# Patient Record
Sex: MALE | Race: WHITE | NOT HISPANIC OR LATINO | Employment: FULL TIME | URBAN - METROPOLITAN AREA
[De-identification: names, ages, dates, MRNs, and addresses within clinical notes are randomized per-mention and may not be internally consistent; named-entity substitution may affect disease eponyms.]

---

## 2019-04-29 ENCOUNTER — OFFICE VISIT (OUTPATIENT)
Dept: UROLOGY | Facility: MEDICAL CENTER | Age: 64
End: 2019-04-29
Payer: COMMERCIAL

## 2019-04-29 VITALS
HEIGHT: 71 IN | SYSTOLIC BLOOD PRESSURE: 136 MMHG | WEIGHT: 315 LBS | DIASTOLIC BLOOD PRESSURE: 78 MMHG | BODY MASS INDEX: 44.1 KG/M2

## 2019-04-29 DIAGNOSIS — N40.1 BPH WITH URINARY OBSTRUCTION: Primary | ICD-10-CM

## 2019-04-29 DIAGNOSIS — N52.8 MIXED ERECTILE DYSFUNCTION: ICD-10-CM

## 2019-04-29 DIAGNOSIS — N13.8 BPH WITH URINARY OBSTRUCTION: Primary | ICD-10-CM

## 2019-04-29 LAB
SL AMB  POCT GLUCOSE, UA: ABNORMAL
SL AMB LEUKOCYTE ESTERASE,UA: ABNORMAL
SL AMB POCT BILIRUBIN,UA: ABNORMAL
SL AMB POCT BLOOD,UA: ABNORMAL
SL AMB POCT CLARITY,UA: CLEAR
SL AMB POCT COLOR,UA: YELLOW
SL AMB POCT KETONES,UA: ABNORMAL
SL AMB POCT NITRITE,UA: ABNORMAL
SL AMB POCT PH,UA: 5
SL AMB POCT SPECIFIC GRAVITY,UA: 1.02
SL AMB POCT URINE PROTEIN: ABNORMAL
SL AMB POCT UROBILINOGEN: 0.2

## 2019-04-29 PROCEDURE — 99244 OFF/OP CNSLTJ NEW/EST MOD 40: CPT | Performed by: UROLOGY

## 2019-04-29 PROCEDURE — 81003 URINALYSIS AUTO W/O SCOPE: CPT | Performed by: UROLOGY

## 2019-04-29 RX ORDER — SILDENAFIL CITRATE 20 MG/1
TABLET ORAL
Qty: 90 TABLET | Refills: 3 | Status: SHIPPED | OUTPATIENT
Start: 2019-04-29 | End: 2020-05-01 | Stop reason: DRUGHIGH

## 2020-05-01 DIAGNOSIS — N52.8 MIXED ERECTILE DYSFUNCTION: Primary | ICD-10-CM

## 2020-05-04 RX ORDER — SILDENAFIL 100 MG/1
TABLET, FILM COATED ORAL
Qty: 30 TABLET | Refills: 1 | OUTPATIENT
Start: 2020-05-04 | End: 2021-03-08 | Stop reason: DRUGHIGH

## 2020-05-06 RX ORDER — SILDENAFIL CITRATE 20 MG/1
TABLET ORAL
Qty: 90 TABLET | Refills: 3 | Status: SHIPPED | OUTPATIENT
Start: 2020-05-06 | End: 2021-03-08 | Stop reason: ALTCHOICE

## 2020-07-20 ENCOUNTER — TELEPHONE (OUTPATIENT)
Dept: UROLOGY | Facility: MEDICAL CENTER | Age: 65
End: 2020-07-20

## 2020-07-20 NOTE — TELEPHONE ENCOUNTER
Pt called regarding message he states Medicare is primary he did not have his card to give information,also his supplement is Zoran URIBE#[id number]  # he will call back with new Medicare #

## 2020-07-27 ENCOUNTER — OFFICE VISIT (OUTPATIENT)
Dept: UROLOGY | Facility: MEDICAL CENTER | Age: 65
End: 2020-07-27
Payer: MEDICARE

## 2020-07-27 VITALS
DIASTOLIC BLOOD PRESSURE: 78 MMHG | SYSTOLIC BLOOD PRESSURE: 134 MMHG | BODY MASS INDEX: 44.1 KG/M2 | WEIGHT: 315 LBS | TEMPERATURE: 96.6 F | HEIGHT: 71 IN

## 2020-07-27 DIAGNOSIS — N13.8 BPH WITH URINARY OBSTRUCTION: Primary | ICD-10-CM

## 2020-07-27 DIAGNOSIS — E29.1 HYPOGONADISM IN MALE: ICD-10-CM

## 2020-07-27 DIAGNOSIS — N40.1 BPH WITH URINARY OBSTRUCTION: Primary | ICD-10-CM

## 2020-07-27 DIAGNOSIS — E66.01 MORBID OBESITY WITH BMI OF 45.0-49.9, ADULT (HCC): ICD-10-CM

## 2020-07-27 DIAGNOSIS — N52.8 MIXED ERECTILE DYSFUNCTION: ICD-10-CM

## 2020-07-27 PROCEDURE — 99214 OFFICE O/P EST MOD 30 MIN: CPT | Performed by: UROLOGY

## 2020-07-27 RX ORDER — MAG HYDROX/ALUMINUM HYD/SIMETH 400-400-40
SUSPENSION, ORAL (FINAL DOSE FORM) ORAL
COMMUNITY

## 2020-07-27 NOTE — PROGRESS NOTES
HISTORY:    1  Recently started testosterone injections every two weeks at PCP  His T level was mildly low, 179  He thinks perhaps his workouts at the gym are more productive, and he is gaining muscle mass, and that is why cannot lose weight  2  ED, symptoms come and go, not using Viagra now  3  Morbid obesity, we talked about that in some detail  4  Mild BPH, not that bothered by change in flow    Recent PSA 0 6         ASSESSMENT / PLAN:    I strongly encouraged patient to go that go back to [facility] for nutrition advice    I told him when he gets his T level checked, it should be about two days after an injection  If other symptoms stable, follow-up two years    The following portions of the patient's history were reviewed and updated as appropriate: allergies, current medications, past family history, past medical history, past social history, past surgical history and problem list     Review of Systems   All other systems reviewed and are negative  Objective:     Physical Exam   Constitutional: He is oriented to person, place, and time  He appears well-developed and well-nourished  No distress  Morbidly obese   HENT:   Head: Normocephalic and atraumatic  Eyes: No scleral icterus  Pulmonary/Chest: Effort normal    Genitourinary:   Genitourinary Comments: Penis testes normal    Cannot feel prostate due to obesity   Neurological: He is alert and oriented to person, place, and time  Skin: He is not diaphoretic  No pallor  Psychiatric: He has a normal mood and affect   His behavior is normal  Judgment and thought content normal          No results found for: PSA]  No results found for: BUN  No results found for: CREATININE  No components found for: CBC      Patient Active Problem List   Diagnosis    BPH with urinary obstruction    Mixed erectile dysfunction    Morbid obesity with BMI of 45 0-49 9, adult ([facility] [address] )        Diagnoses and all orders for this visit:    BPH with urinary obstruction    Mixed erectile dysfunction    Hypogonadism in male    Morbid obesity with BMI of 45 0-49 9, adult ([facility] [address] )    Other orders  -     Cholecalciferol (VITAMIN D3) 125 MCG (5000 UT) CAPS; Take by mouth  -     TESTOSTERONE CYPIONATE IJ; Inject as directed           Patient ID: Isha Henderson is a 72 y o  male  Current Outpatient Medications:     aspirin 325 mg tablet, Take 325 mg by mouth, Disp: , Rfl:     Cholecalciferol (VITAMIN D3) 125 MCG (5000 UT) CAPS, Take by mouth, Disp: , Rfl:     Lancets MISC, Test blood sugar 2-3x daily  Verio lancets, Disp: , Rfl:     TESTOSTERONE CYPIONATE IJ, Inject as directed, Disp: , Rfl:     insulin glargine (LANTUS SOLOSTAR) 100 units/mL injection pen, Inject 20 Units under the skin, Disp: , Rfl:     montelukast (SINGULAIR) 10 mg tablet, Take 10 mg by mouth, Disp: , Rfl:     rosuvastatin (CRESTOR) 10 MG tablet, 3 (three) times a week (MWF) at 1800 , Disp: , Rfl:     sildenafil (REVATIO) 20 mg tablet, TAKE 3,4 OR 5 TABLETS BY MOUTH AS NEEDED  (Patient not taking: Reported on 7/27/2020), Disp: 90 tablet, Rfl: 3    sildenafil (VIAGRA) 100 mg tablet, Take 1 tablet by mouth one (1) hour prior to intercourse on an empty stomach  Limit to 3 encounters per week   (Patient not taking: Reported on 7/27/2020), Disp: 30 tablet, Rfl: 1    Past Medical History:   Diagnosis Date    Asthma     BPH with obstruction/lower urinary tract symptoms     Heart disease     Type 2 diabetes mellitus (HCC)     Urgency of urination        Past Surgical History:   Procedure Laterality Date    APPENDECTOMY         Social History

## 2020-09-08 DIAGNOSIS — Z01.812 PRE-PROCEDURAL LABORATORY EXAMINATIONS: ICD-10-CM

## 2020-09-08 PROCEDURE — U0003 INFECTIOUS AGENT DETECTION BY NUCLEIC ACID (DNA OR RNA); SEVERE ACUTE RESPIRATORY SYNDROME CORONAVIRUS 2 (SARS-COV-2) (CORONAVIRUS DISEASE [COVID-19]), AMPLIFIED PROBE TECHNIQUE, MAKING USE OF HIGH THROUGHPUT TECHNOLOGIES AS DESCRIBED BY CMS-2020-01-R: HCPCS

## 2020-09-09 LAB — SARS-COV-2 RNA SPEC QL NAA+PROBE: NOT DETECTED

## 2021-03-05 DIAGNOSIS — N52.8 MIXED ERECTILE DYSFUNCTION: Primary | ICD-10-CM

## 2021-03-05 NOTE — TELEPHONE ENCOUNTER
Pt managed by Dr Aguirre, pt called asking if physician could prescribe generic Cialis [facility] as Sildenafil not working

## 2021-03-08 RX ORDER — TADALAFIL 20 MG/1
TABLET ORAL
Qty: 10 TABLET | Refills: 1 | Status: SHIPPED | OUTPATIENT
Start: 2021-03-08 | End: 2022-05-04 | Stop reason: SDUPTHER

## 2021-03-08 NOTE — TELEPHONE ENCOUNTER
Patient last seen July, 2020 for year exam and not expected back for TWO YEARS  Patient states Sildenafil 100mg is ineffective and would like to try Tadalafil        Script for the requested medication was queued and forwarded to the Advanced Practitioner covering the \[facility]\"" location for approval

## 2021-03-09 ENCOUNTER — TRANSCRIBE ORDERS (OUTPATIENT)
Dept: ADMISSIONS | Facility: HOSPITAL | Age: 66
End: 2021-03-09

## 2021-03-12 ENCOUNTER — IMMUNIZATIONS (OUTPATIENT)
Dept: FAMILY MEDICINE CLINIC | Facility: HOSPITAL | Age: 66
End: 2021-03-12

## 2021-03-12 DIAGNOSIS — Z23 ENCOUNTER FOR IMMUNIZATION: Primary | ICD-10-CM

## 2021-03-12 PROCEDURE — 0011A SARS-COV-2 / COVID-19 MRNA VACCINE (MODERNA) 100 MCG: CPT

## 2021-03-12 PROCEDURE — 91301 SARS-COV-2 / COVID-19 MRNA VACCINE (MODERNA) 100 MCG: CPT

## 2021-04-09 ENCOUNTER — IMMUNIZATIONS (OUTPATIENT)
Dept: FAMILY MEDICINE CLINIC | Facility: HOSPITAL | Age: 66
End: 2021-04-09

## 2021-04-09 DIAGNOSIS — Z23 ENCOUNTER FOR IMMUNIZATION: Primary | ICD-10-CM

## 2021-04-09 PROCEDURE — 0012A SARS-COV-2 / COVID-19 MRNA VACCINE (MODERNA) 100 MCG: CPT

## 2021-04-09 PROCEDURE — 91301 SARS-COV-2 / COVID-19 MRNA VACCINE (MODERNA) 100 MCG: CPT

## 2021-07-09 ENCOUNTER — TELEPHONE (OUTPATIENT)
Dept: UROLOGY | Facility: MEDICAL CENTER | Age: 66
End: 2021-07-09

## 2021-07-09 NOTE — TELEPHONE ENCOUNTER
Patient due this month for an office visit with Dr Sebastien Snell in the [facility] location  He is overdue for an office visit  The requested prescription is DECLINED at this time until an appointment can be scheduled and kept  Message will be forwarded to the [facility] so they can schedule an appointment

## 2021-07-31 ENCOUNTER — TELEPHONE (OUTPATIENT)
Dept: OTHER | Facility: OTHER | Age: 66
End: 2021-07-31

## 2021-07-31 NOTE — TELEPHONE ENCOUNTER
Patient called to cancel appointment schedule for Monday Aug 2, 2021  4:00 PM  FOLLOW UP [facility]  Nora Hylton PA-C  [facility]  Due to out of town in [place] and will call back when ready to reschedule currently has a busy schedule

## 2021-10-08 ENCOUNTER — NURSE TRIAGE (OUTPATIENT)
Dept: OTHER | Facility: OTHER | Age: 66
End: 2021-10-08

## 2021-10-08 DIAGNOSIS — Z20.822 EXPOSURE TO COVID-19 VIRUS: Primary | ICD-10-CM

## 2021-12-03 ENCOUNTER — IMMUNIZATIONS (OUTPATIENT)
Dept: FAMILY MEDICINE CLINIC | Facility: HOSPITAL | Age: 66
End: 2021-12-03

## 2021-12-03 DIAGNOSIS — Z23 ENCOUNTER FOR IMMUNIZATION: Primary | ICD-10-CM

## 2021-12-03 PROCEDURE — 0064A COVID-19 MODERNA VACC 0.25 ML BOOSTER: CPT

## 2021-12-03 PROCEDURE — 91306 COVID-19 MODERNA VACC 0.25 ML BOOSTER: CPT

## 2021-12-30 PROCEDURE — U0003 INFECTIOUS AGENT DETECTION BY NUCLEIC ACID (DNA OR RNA); SEVERE ACUTE RESPIRATORY SYNDROME CORONAVIRUS 2 (SARS-COV-2) (CORONAVIRUS DISEASE [COVID-19]), AMPLIFIED PROBE TECHNIQUE, MAKING USE OF HIGH THROUGHPUT TECHNOLOGIES AS DESCRIBED BY CMS-2020-01-R: HCPCS | Performed by: FAMILY MEDICINE

## 2022-05-04 ENCOUNTER — OFFICE VISIT (OUTPATIENT)
Dept: UROLOGY | Facility: MEDICAL CENTER | Age: 67
End: 2022-05-04
Payer: MEDICARE

## 2022-05-04 VITALS
SYSTOLIC BLOOD PRESSURE: 160 MMHG | BODY MASS INDEX: 43.68 KG/M2 | HEIGHT: 71 IN | DIASTOLIC BLOOD PRESSURE: 90 MMHG | WEIGHT: 312 LBS | HEART RATE: 86 BPM

## 2022-05-04 DIAGNOSIS — N52.1 ERECTILE DYSFUNCTION ASSOCIATED WITH TYPE 2 DIABETES MELLITUS (HCC): Primary | ICD-10-CM

## 2022-05-04 DIAGNOSIS — R68.82 LOW LIBIDO: ICD-10-CM

## 2022-05-04 DIAGNOSIS — N13.8 BPH WITH URINARY OBSTRUCTION: ICD-10-CM

## 2022-05-04 DIAGNOSIS — Z12.5 PROSTATE CANCER SCREENING: ICD-10-CM

## 2022-05-04 DIAGNOSIS — N52.8 MIXED ERECTILE DYSFUNCTION: ICD-10-CM

## 2022-05-04 DIAGNOSIS — E29.1 HYPOGONADISM IN MALE: ICD-10-CM

## 2022-05-04 DIAGNOSIS — N40.1 BPH WITH URINARY OBSTRUCTION: ICD-10-CM

## 2022-05-04 DIAGNOSIS — E11.69 ERECTILE DYSFUNCTION ASSOCIATED WITH TYPE 2 DIABETES MELLITUS (HCC): Primary | ICD-10-CM

## 2022-05-04 PROCEDURE — 99214 OFFICE O/P EST MOD 30 MIN: CPT | Performed by: UROLOGY

## 2022-05-04 RX ORDER — TADALAFIL 20 MG/1
TABLET ORAL
Qty: 10 TABLET | Refills: 1 | Status: SHIPPED | OUTPATIENT
Start: 2022-05-04 | End: 2022-08-10 | Stop reason: SDUPTHER

## 2022-05-04 RX ORDER — PREGABALIN 75 MG/1
75 CAPSULE ORAL 3 TIMES DAILY
COMMUNITY
Start: 2022-04-14

## 2022-05-04 RX ORDER — BISOPROLOL FUMARATE 5 MG/1
5 TABLET ORAL DAILY
COMMUNITY
Start: 2022-02-14

## 2022-05-04 RX ORDER — UBIDECARENONE 75 MG
CAPSULE ORAL DAILY
COMMUNITY

## 2022-05-04 NOTE — PROGRESS NOTES
Assessment/Plan:  1  Erectile dysfunction-patient to try Cialis 20 mg on demand  2  Low libido/hypogonadism-on testosterone replacement therapy with last total testosterone within normal range  3  BPH with urinary obstruction-voiding adequately with AUA symptom score only 4 -no intervention  5  Prostate cancer screening-PSA due in August of 2022  ESTELA not suspicious  No problem-specific Assessment & Plan notes found for this encounter  Diagnoses and all orders for this visit:    Erectile dysfunction associated with type 2 diabetes mellitus ([address] )    Low libido    Hypogonadism in male    BPH with urinary obstruction    Prostate cancer screening    Mixed erectile dysfunction  -     tadalafil (CIALIS) 20 MG tablet; Take 1 tablet by mouth one (1) hour prior to intercourse on an empty stomach  Limit to 2 tablets per week  Body mass index (BMI) 40 0-44 9, adult (HCC)    Other orders  -     pregabalin (LYRICA) 75 mg capsule; Take 75 mg by mouth 3 (three) times a day  -     cyanocobalamin (VITAMIN B-12) 100 mcg tablet; Take by mouth daily  -     bisoprolol (ZEBETA) 5 mg tablet; Take 5 mg by mouth daily (Patient not taking: Reported on 5/4/2022 )          Subjective:      Patient ID: Rahat Marcelino is a 79 y o  male  HPI  26-year-old gentleman with history of diabetes mellitus as well as male hypogonadism treated with testosterone replacement therapy presents for discussion of erectile dysfunction  The patient notes that he has used PDE5 inhibitors in the past with some success however he does not usually use them  He notes at ability to get an erection capable of intercourse but notes that he has early loss of erection    He presents for follow-up  The following portions of the patient's history were reviewed and updated as appropriate: allergies, current medications, past family history, past medical history, past social history, past surgical history and problem list     Review of Systems Genitourinary:        AUA symptom score is 4 with nocturia 0-1 time per night 1/5 for frequency intermittency and weakening of the urinary stream    Musculoskeletal: Positive for myalgias  Objective:      /90   Pulse 86   Ht 5' 11" (1 803 m)   Wt (!) 142 kg (312 lb)   BMI 43 52 kg/m²          Physical Exam  Vitals reviewed  Constitutional:       General: He is not in acute distress  Appearance: Normal appearance  He is obese  He is not ill-appearing, toxic-appearing or diaphoretic  HENT:      Head: Normocephalic and atraumatic  Nose: Nose normal       Mouth/Throat:      Mouth: Mucous membranes are moist    Eyes:      Extraocular Movements: Extraocular movements intact  Pulmonary:      Effort: Pulmonary effort is normal  No respiratory distress  Abdominal:      General: There is no distension  Palpations: Abdomen is soft  Genitourinary:     Comments: Phallus normal uncircumcised without cutaneous lesions  Meatus patent normally placed  Scrotum normal without cutaneous lesions  Testes adnexa palpably descended without masses or tenderness  No palpable fluid collections  ESTELA normal anal verge normal anal sphincter tone no palpable rectal masses  Prostate approximately 25-30 g a nodular nontender  Musculoskeletal:         General: Normal range of motion  Skin:     General: Skin is dry  Neurological:      Mental Status: He is alert and oriented to person, place, and time  Psychiatric:         Mood and Affect: Mood normal          Behavior: Behavior normal          Thought Content:  Thought content normal          Judgment: Judgment normal

## 2022-08-10 ENCOUNTER — OFFICE VISIT (OUTPATIENT)
Dept: UROLOGY | Facility: MEDICAL CENTER | Age: 67
End: 2022-08-10
Payer: MEDICARE

## 2022-08-10 VITALS
BODY MASS INDEX: 42.56 KG/M2 | WEIGHT: 304 LBS | DIASTOLIC BLOOD PRESSURE: 80 MMHG | SYSTOLIC BLOOD PRESSURE: 138 MMHG | HEART RATE: 99 BPM | HEIGHT: 71 IN

## 2022-08-10 DIAGNOSIS — N40.1 BPH WITH URINARY OBSTRUCTION: ICD-10-CM

## 2022-08-10 DIAGNOSIS — E11.69 ERECTILE DYSFUNCTION ASSOCIATED WITH TYPE 2 DIABETES MELLITUS (HCC): Primary | ICD-10-CM

## 2022-08-10 DIAGNOSIS — N13.8 BPH WITH URINARY OBSTRUCTION: ICD-10-CM

## 2022-08-10 DIAGNOSIS — N52.1 ERECTILE DYSFUNCTION ASSOCIATED WITH TYPE 2 DIABETES MELLITUS (HCC): Primary | ICD-10-CM

## 2022-08-10 DIAGNOSIS — R68.82 LOW LIBIDO: ICD-10-CM

## 2022-08-10 DIAGNOSIS — Z12.5 PROSTATE CANCER SCREENING: ICD-10-CM

## 2022-08-10 PROCEDURE — 99214 OFFICE O/P EST MOD 30 MIN: CPT | Performed by: UROLOGY

## 2022-08-10 RX ORDER — TESTOSTERONE CYPIONATE 200 MG/ML
INJECTION INTRAMUSCULAR
COMMUNITY
Start: 2022-07-22

## 2022-08-10 RX ORDER — TADALAFIL 20 MG/1
TABLET ORAL
Qty: 30 TABLET | Refills: 5 | Status: SHIPPED | OUTPATIENT
Start: 2022-08-10

## 2022-08-10 NOTE — PROGRESS NOTES
Assessment/Plan:  1  Erectile dysfunction-positive response from Cialis 20 mg with prescription requested  No side effects reported    2  BPH with urinary obstruction-very mild with AUA symptom score only 1     3  Prostate cancer screening-PSA is well within normal limits and ESTELA is not suspicious of malignancy    4  Low libido-minimal patient on testosterone therapy  Discussed oral pills for testosterone replacement therapy and patient will consider        No problem-specific Assessment & Plan notes found for this encounter  Diagnoses and all orders for this visit:    Erectile dysfunction associated with type 2 diabetes mellitus ([address] )    BPH with urinary obstruction    Prostate cancer screening    Low libido    Other orders  -     testosterone cypionate (DEPO-TESTOSTERONE) 200 mg/mL SOLN; INJECT 1 MILLILITER BY INTRAMUSCULAR ROUTE EVERY 2 WEEKS          Subjective:      Patient ID: Cayla Lopez is a 79 y o  male  HPI  66-year-old male on testosterone replacement therapy by his PCP with erectile dysfunction reports Cialis 20 mg gave him a positive erectile response  PSA is well within normal limits and low  The patient denies any significant obstructive irritative voiding symptoms  The following portions of the patient's history were reviewed and updated as appropriate: allergies, current medications, past family history, past medical history, past social history, past surgical history and problem list     Review of Systems   All other systems reviewed and are negative  Objective:      /80   Pulse 99   Ht 5' 11" (1 803 m)   Wt (!) 138 kg (304 lb)   BMI 42 40 kg/m²          Physical Exam  Vitals reviewed  Constitutional:       General: He is not in acute distress  Appearance: Normal appearance  He is obese  He is not ill-appearing, toxic-appearing or diaphoretic  HENT:      Head: Normocephalic and atraumatic        Nose: Nose normal       Mouth/Throat:      Mouth: Mucous membranes are moist    Eyes:      Extraocular Movements: Extraocular movements intact  Pulmonary:      Effort: Pulmonary effort is normal    Genitourinary:     Testes: Normal       Comments: Right epididymal cyst at head of epid  Musculoskeletal:      Cervical back: Neck supple  Skin:     General: Skin is dry  Neurological:      Mental Status: He is alert and oriented to person, place, and time  Psychiatric:         Mood and Affect: Mood normal          Behavior: Behavior normal          Thought Content:  Thought content normal          Judgment: Judgment normal

## 2023-11-13 DIAGNOSIS — N52.1 ERECTILE DYSFUNCTION ASSOCIATED WITH TYPE 2 DIABETES MELLITUS: ICD-10-CM

## 2023-11-13 DIAGNOSIS — E11.69 ERECTILE DYSFUNCTION ASSOCIATED WITH TYPE 2 DIABETES MELLITUS: ICD-10-CM

## 2023-11-13 RX ORDER — TADALAFIL 20 MG/1
TABLET ORAL
Qty: 30 TABLET | Refills: 5 | Status: SHIPPED | OUTPATIENT
Start: 2023-11-13

## 2024-03-12 DIAGNOSIS — N13.8 BPH WITH URINARY OBSTRUCTION: Primary | ICD-10-CM

## 2024-03-12 DIAGNOSIS — E29.1 HYPOGONADISM IN MALE: ICD-10-CM

## 2024-03-12 DIAGNOSIS — N40.1 BPH WITH URINARY OBSTRUCTION: Primary | ICD-10-CM

## 2024-03-12 RX ORDER — CLOPIDOGREL BISULFATE 75 MG/1
75 TABLET ORAL DAILY
COMMUNITY
Start: 2024-02-02

## 2024-03-12 RX ORDER — BLOOD SUGAR DIAGNOSTIC
1 STRIP MISCELLANEOUS DAILY PRN
COMMUNITY
Start: 2024-01-30

## 2024-03-12 RX ORDER — NITROGLYCERIN 0.4 MG/1
0.4 TABLET SUBLINGUAL
COMMUNITY
Start: 2023-12-19

## 2024-03-12 RX ORDER — CARVEDILOL 12.5 MG/1
12.5 TABLET ORAL 2 TIMES DAILY WITH MEALS
COMMUNITY
Start: 2023-12-19

## 2024-03-12 RX ORDER — LOSARTAN POTASSIUM 100 MG/1
100 TABLET ORAL DAILY
COMMUNITY
Start: 2024-02-19

## 2024-03-12 RX ORDER — PRASUGREL 10 MG/1
10 TABLET, FILM COATED ORAL DAILY
COMMUNITY
Start: 2023-12-19

## 2024-03-12 RX ORDER — AMLODIPINE BESYLATE 5 MG/1
5 TABLET ORAL DAILY
COMMUNITY
Start: 2023-12-19

## 2024-03-12 RX ORDER — METOPROLOL SUCCINATE 50 MG
50 TABLET, EXTENDED RELEASE 24 HR ORAL DAILY
COMMUNITY
Start: 2023-12-28

## 2024-03-18 ENCOUNTER — TELEPHONE (OUTPATIENT)
Age: 69
End: 2024-03-18

## 2024-03-18 NOTE — TELEPHONE ENCOUNTER
Patient of Dr. Diaz at [facility]    Patient called stating he needs to do psa,cmp prior to appointment on 03/26/24.  Orders faxed to [facility] via electronic fax.     [facility] fax [phone]

## 2024-03-26 ENCOUNTER — OFFICE VISIT (OUTPATIENT)
Dept: UROLOGY | Facility: MEDICAL CENTER | Age: 69
End: 2024-03-26
Payer: MEDICARE

## 2024-03-26 VITALS
HEIGHT: 69 IN | BODY MASS INDEX: 44.14 KG/M2 | OXYGEN SATURATION: 96 % | HEART RATE: 59 BPM | DIASTOLIC BLOOD PRESSURE: 60 MMHG | SYSTOLIC BLOOD PRESSURE: 150 MMHG | WEIGHT: 298 LBS

## 2024-03-26 DIAGNOSIS — R68.82 LOW LIBIDO: ICD-10-CM

## 2024-03-26 DIAGNOSIS — E11.69 ERECTILE DYSFUNCTION ASSOCIATED WITH TYPE 2 DIABETES MELLITUS  (HCC): ICD-10-CM

## 2024-03-26 DIAGNOSIS — E66.01 MORBID OBESITY WITH BMI OF 45.0-49.9, ADULT (HCC): ICD-10-CM

## 2024-03-26 DIAGNOSIS — N40.1 BPH WITH URINARY OBSTRUCTION: Primary | ICD-10-CM

## 2024-03-26 DIAGNOSIS — N52.1 ERECTILE DYSFUNCTION ASSOCIATED WITH TYPE 2 DIABETES MELLITUS  (HCC): ICD-10-CM

## 2024-03-26 DIAGNOSIS — N13.8 BPH WITH URINARY OBSTRUCTION: Primary | ICD-10-CM

## 2024-03-26 DIAGNOSIS — Z12.5 PROSTATE CANCER SCREENING: ICD-10-CM

## 2024-03-26 DIAGNOSIS — E29.1 HYPOGONADISM IN MALE: ICD-10-CM

## 2024-03-26 PROCEDURE — 99214 OFFICE O/P EST MOD 30 MIN: CPT | Performed by: UROLOGY

## 2024-03-26 RX ORDER — ASPIRIN 81 MG/1
81 TABLET, CHEWABLE ORAL DAILY
COMMUNITY

## 2024-03-26 NOTE — PROGRESS NOTES
Assessment/Plan:  #1.  BPH with lower tract symptoms-symptoms very mild with low AUA symptom score-no intervention at this time    2.  Erectile dysfunction-responsive to Cialis 20 mg however currently not sexually active    3.  Prostate cancer screening-normal ESTELA and PSA repeat 1 year    4.  Low libido-previous testosterone panel normal-no further intervention    5.  History of hypogonadism-currently off TRT for the past 4 months or so-plan testosterone panel  No problem-specific Assessment & Plan notes found for this encounter.       Diagnoses and all orders for this visit:    BPH with urinary obstruction  -     PSA Total, Diagnostic; Future    Erectile dysfunction associated with type 2 diabetes mellitus   -     Comprehensive metabolic panel; Future    Prostate cancer screening  -     PSA Total, Diagnostic; Future    Low libido  -     Comprehensive metabolic panel; Future    Morbid obesity with BMI of 45.0-49.9, adult (HCC)    Hypogonadism in male  -     Testosterone, free, total    Other orders  -     aspirin 81 mg chewable tablet; Chew 81 mg daily          Subjective:      Patient ID: Christophe Manrique is a 69 y.o. male.    HPI  69-year-old gentleman with a history of testosterone replacement therapy followed by his PCP for that seen by me for erectile dysfunction placed on Cialis 20 mg on demand presents for prostate cancer check.  He has normal PSA and notes only mild obstructive and irritative voiding symptoms particularly with a full bladder in the morning noting some change in his voiding pattern.  The patient does note nocturia once nightly that was not present prior.  He denies gross hematuria and dysuria  The following portions of the patient's history were reviewed and updated as appropriate: allergies, current medications, past family history, past medical history, past social history, past surgical history, and problem list.    Review of Systems   Musculoskeletal:  Positive for arthralgias and myalgias.  "  All other systems reviewed and are negative.        Objective:      /60 (BP Location: Left arm, Patient Position: Sitting, Cuff Size: Standard)   Pulse 59   Ht 5' 9\" (1.753 m)   Wt 135 kg (298 lb)   SpO2 96%   BMI 44.01 kg/m²          Physical Exam  Constitutional:       General: He is not in acute distress.     Appearance: Normal appearance. He is obese. He is not ill-appearing, toxic-appearing or diaphoretic.   HENT:      Head: Normocephalic and atraumatic.      Nose: Nose normal.      Mouth/Throat:      Mouth: Mucous membranes are moist.   Eyes:      Extraocular Movements: Extraocular movements intact.   Pulmonary:      Effort: Pulmonary effort is normal. No respiratory distress.   Genitourinary:     Penis: Normal.       Testes: Normal.      Prostate: Normal.      Rectum: Normal.   Skin:     General: Skin is dry.   Neurological:      Mental Status: He is alert and oriented to person, place, and time.   Psychiatric:         Mood and Affect: Mood normal.         Behavior: Behavior normal.         Thought Content: Thought content normal.         Judgment: Judgment normal.           "

## 2024-03-26 NOTE — LETTER
March 26, 2024     Noe Javed MD  [address]  [address]  [place] [address]    Patient: Christophe Manrique   YOB: 1955   Date of Visit: 3/26/2024       Dear Dr. Javed:    Thank you for referring Christophe Manrique to me for evaluation. Below are my notes for this consultation.    If you have questions, please do not hesitate to call me. I look forward to following your patient along with you.         Sincerely,        Carrillo Diaz MD        CC: No Recipients    Carrillo Diaz MD  3/26/2024  2:44 PM  Sign when Signing Visit  Assessment/Plan:  #1.  BPH with lower tract symptoms-symptoms very mild with low AUA symptom score-no intervention at this time    2.  Erectile dysfunction-responsive to Cialis 20 mg however currently not sexually active    3.  Prostate cancer screening-normal ESTELA and PSA repeat 1 year    4.  Low libido-previous testosterone panel normal-no further intervention    5.  History of hypogonadism-currently off TRT for the past 4 months or so-plan testosterone panel  No problem-specific Assessment & Plan notes found for this encounter.       Diagnoses and all orders for this visit:    BPH with urinary obstruction  -     PSA Total, Diagnostic; Future    Erectile dysfunction associated with type 2 diabetes mellitus   -     Comprehensive metabolic panel; Future    Prostate cancer screening  -     PSA Total, Diagnostic; Future    Low libido  -     Comprehensive metabolic panel; Future    Morbid obesity with BMI of 45.0-49.9, adult (HCC)    Hypogonadism in male  -     Testosterone, free, total    Other orders  -     aspirin 81 mg chewable tablet; Chew 81 mg daily          Subjective:      Patient ID: Christophe Manrique is a 69 y.o. male.    HPI  69-year-old gentleman with a history of testosterone replacement therapy followed by his PCP for that seen by me for erectile dysfunction placed on Cialis 20 mg on demand presents for prostate cancer check.  He has normal PSA and  "notes only mild obstructive and irritative voiding symptoms particularly with a full bladder in the morning noting some change in his voiding pattern.  The patient does note nocturia once nightly that was not present prior.  He denies gross hematuria and dysuria  The following portions of the patient's history were reviewed and updated as appropriate: allergies, current medications, past family history, past medical history, past social history, past surgical history, and problem list.    Review of Systems   Musculoskeletal:  Positive for arthralgias and myalgias.   All other systems reviewed and are negative.        Objective:      /60 (BP Location: Left arm, Patient Position: Sitting, Cuff Size: Standard)   Pulse 59   Ht 5' 9\" (1.753 m)   Wt 135 kg (298 lb)   SpO2 96%   BMI 44.01 kg/m²          Physical Exam  Constitutional:       General: He is not in acute distress.     Appearance: Normal appearance. He is obese. He is not ill-appearing, toxic-appearing or diaphoretic.   HENT:      Head: Normocephalic and atraumatic.      Nose: Nose normal.      Mouth/Throat:      Mouth: Mucous membranes are moist.   Eyes:      Extraocular Movements: Extraocular movements intact.   Pulmonary:      Effort: Pulmonary effort is normal. No respiratory distress.   Genitourinary:     Penis: Normal.       Testes: Normal.      Prostate: Normal.      Rectum: Normal.   Skin:     General: Skin is dry.   Neurological:      Mental Status: He is alert and oriented to person, place, and time.   Psychiatric:         Mood and Affect: Mood normal.         Behavior: Behavior normal.         Thought Content: Thought content normal.         Judgment: Judgment normal.           "

## 2025-01-10 ENCOUNTER — TELEPHONE (OUTPATIENT)
Age: 70
End: 2025-01-10

## 2025-01-10 NOTE — TELEPHONE ENCOUNTER
Pt calling stating he was sick w/ upper respiratory problems and is getting over it but still has breathing and weezing issues. Pt not sure if he should rs his EGD procedure on 1/16/25. I advised pt to see how he feels over the weekend and call us on Monday. We can have him speak w/ a nurse then. Per pt he will do that and thanked me.

## 2025-01-13 ENCOUNTER — TELEPHONE (OUTPATIENT)
Age: 70
End: 2025-01-13

## 2025-01-13 NOTE — TELEPHONE ENCOUNTER
Patient calling to reschedule his EGD. Review of appointment desk shows that patient is scheduled with . Patient was transferred to 's office for assistance.

## 2025-02-26 RX ORDER — SODIUM CHLORIDE, SODIUM LACTATE, POTASSIUM CHLORIDE, CALCIUM CHLORIDE 600; 310; 30; 20 MG/100ML; MG/100ML; MG/100ML; MG/100ML
125 INJECTION, SOLUTION INTRAVENOUS CONTINUOUS
Status: CANCELLED | OUTPATIENT
Start: 2025-02-26

## 2025-02-27 ENCOUNTER — HOSPITAL ENCOUNTER (OUTPATIENT)
Dept: GASTROENTEROLOGY | Facility: HOSPITAL | Age: 70
Setting detail: OUTPATIENT SURGERY
Discharge: HOME | End: 2025-02-27
Attending: INTERNAL MEDICINE

## 2025-02-27 VITALS — HEIGHT: 71 IN | BODY MASS INDEX: 44.1 KG/M2 | WEIGHT: 315 LBS | TEMPERATURE: 97.6 F

## 2025-02-27 DIAGNOSIS — K21.9 GASTROESOPHAGEAL REFLUX DISEASE, UNSPECIFIED WHETHER ESOPHAGITIS PRESENT: ICD-10-CM

## 2025-02-27 DIAGNOSIS — R13.10 DYSPHAGIA, UNSPECIFIED TYPE: ICD-10-CM

## 2025-02-27 PROBLEM — I25.2 STATUS POST NON-ST ELEVATION MYOCARDIAL INFARCTION (NSTEMI): Status: ACTIVE | Noted: 2021-11-05

## 2025-02-27 PROBLEM — Z86.0100 HISTORY OF COLONIC POLYPS: Status: ACTIVE | Noted: 2017-05-04

## 2025-02-27 PROBLEM — E11.9 TYPE 2 DIABETES MELLITUS WITHOUT COMPLICATION, WITHOUT LONG-TERM CURRENT USE OF INSULIN (HCC): Status: ACTIVE | Noted: 2023-12-16

## 2025-02-27 PROBLEM — Z95.5 S/P DRUG ELUTING CORONARY STENT PLACEMENT: Status: ACTIVE | Noted: 2023-12-28

## 2025-02-27 PROBLEM — I83.893 VARICOSE VEINS OF BILATERAL LOWER EXTREMITIES WITH OTHER COMPLICATIONS: Status: ACTIVE | Noted: 2020-07-28

## 2025-02-27 PROBLEM — Z83.719 FAMILY HISTORY OF COLONIC POLYPS: Status: ACTIVE | Noted: 2017-05-04

## 2025-02-27 PROBLEM — Z12.11 SCREEN FOR COLON CANCER: Status: ACTIVE | Noted: 2017-05-04

## 2025-02-27 PROBLEM — I87.2 VENOUS INSUFFICIENCY: Status: ACTIVE | Noted: 2020-07-28

## 2025-02-27 PROBLEM — Z01.818 PREOP EXAMINATION: Status: ACTIVE | Noted: 2024-02-02

## 2025-02-27 RX ORDER — SODIUM CHLORIDE, SODIUM LACTATE, POTASSIUM CHLORIDE, CALCIUM CHLORIDE 600; 310; 30; 20 MG/100ML; MG/100ML; MG/100ML; MG/100ML
125 INJECTION, SOLUTION INTRAVENOUS CONTINUOUS
Status: DISCONTINUED | OUTPATIENT
Start: 2025-02-27 | End: 2025-03-03 | Stop reason: HOSPADM

## 2025-02-27 NOTE — H&P
With anesthesia reviewing with patient he apparently is having dyspnea on exertion scheduled to have a stress test next week.  Canceled the procedure today and he already has an office visit in about 4 weeks.  He can continue on his pantoprazole until then only does did not have heartburn.  His symptoms sound compatible with a Schatzki's ring

## 2025-02-28 DIAGNOSIS — E11.69 ERECTILE DYSFUNCTION ASSOCIATED WITH TYPE 2 DIABETES MELLITUS  (HCC): ICD-10-CM

## 2025-02-28 DIAGNOSIS — N52.1 ERECTILE DYSFUNCTION ASSOCIATED WITH TYPE 2 DIABETES MELLITUS  (HCC): ICD-10-CM

## 2025-03-03 RX ORDER — TADALAFIL 20 MG/1
TABLET ORAL
Qty: 30 TABLET | Refills: 5 | OUTPATIENT
Start: 2025-03-03

## 2025-03-25 LAB — SL AMB PSA, TOTAL: 0.97 NG/ML

## 2025-03-26 NOTE — PROGRESS NOTES
"Name: Christophe Manrique      : 1955      MRN: [id number]  Encounter Provider: CONNOR Rodriguez  Encounter Date: 3/27/2025   Encounter department: [facility]  :  Assessment & Plan  BPH with urinary obstruction         Erectile dysfunction associated with type 2 diabetes mellitus  (HCC)  Cialis 20mg  Lab Results   Component Value Date    HGBA1C 7.6 (H) 2025       Orders:    tadalafil (CIALIS) 20 MG tablet; Take 1 tablet (20 mg total) by mouth daily as needed for erectile dysfunction    Prostate cancer screening  PSA 0.97 (3/25/2025)  Orders:    PSA, Total Screen; Future    Low libido         Hypogonadism in male  Testosterone level 277 (2025)           History of Present Illness {?Quick Links Encounters * My Last Note * Last Note in Specialty * Snapshot * Since Last Visit * History :81441}  Christophe Manrique is a 70 y.o. male who presents with a history of testosterone replacement therapy followed by his PCP for that seen by me for erectile dysfunction placed on Cialis 20 mg on demand presents for prostate cancer check. He has normal PSA and notes only mild obstructive and irritative voiding symptoms particularly with a full bladder in the morning noting some change in his voiding pattern. The patient does note nocturia once nightly that was not present prior. He denies gross hematuria and dysuria . Denies nocturia.     Had cialis had upset stomach a day afterwards. Then said he had PVCS.       Review of Systems  {Select to insert medical history sections (Optional):16827}     Objective {?Quick Links Trend Vitals * Enter New Vitals * Results Review * Timeline (Adult) * Labs * Imaging * Cardiology * Procedures * Lung Cancer Screening * Surgical eConsent :98629}  /62 (BP Location: Left arm, Patient Position: Sitting, Cuff Size: Standard)   Pulse 71   Ht 5' 11\" (1.803 m)   Wt (!) 145 kg (320 lb)   SpO2 95%   BMI 44.63 kg/m²     Physical Exam ***     Results " "  Lab Results   Component Value Date    PSA 0.97 03/25/2025    PSA 0.80 03/19/2024     Lab Results   Component Value Date    CALCIUM 9.3 01/27/2025    K 4.5 01/27/2025    CO2 29 01/27/2025     01/27/2025    BUN 13 01/27/2025    CREATININE 0.97 01/27/2025     No results found for: \"WBC\", \"HGB\", \"HCT\", \"MCV\", \"PLT\"    Office Urine Dip  No results found for this or any previous visit (from the past hour).    {Administrative / Billing Section (Optional):90474}  "

## 2025-03-27 ENCOUNTER — OFFICE VISIT (OUTPATIENT)
Dept: UROLOGY | Facility: MEDICAL CENTER | Age: 70
End: 2025-03-27

## 2025-03-27 VITALS
HEIGHT: 71 IN | SYSTOLIC BLOOD PRESSURE: 154 MMHG | DIASTOLIC BLOOD PRESSURE: 62 MMHG | BODY MASS INDEX: 44.1 KG/M2 | HEART RATE: 71 BPM | OXYGEN SATURATION: 95 % | WEIGHT: 315 LBS

## 2025-03-27 DIAGNOSIS — E11.69 ERECTILE DYSFUNCTION ASSOCIATED WITH TYPE 2 DIABETES MELLITUS  (HCC): ICD-10-CM

## 2025-03-27 DIAGNOSIS — R68.82 LOW LIBIDO: ICD-10-CM

## 2025-03-27 DIAGNOSIS — N40.1 BPH WITH URINARY OBSTRUCTION: Primary | ICD-10-CM

## 2025-03-27 DIAGNOSIS — E29.1 HYPOGONADISM IN MALE: ICD-10-CM

## 2025-03-27 DIAGNOSIS — N13.8 BPH WITH URINARY OBSTRUCTION: Primary | ICD-10-CM

## 2025-03-27 DIAGNOSIS — Z12.5 PROSTATE CANCER SCREENING: ICD-10-CM

## 2025-03-27 DIAGNOSIS — N52.1 ERECTILE DYSFUNCTION ASSOCIATED WITH TYPE 2 DIABETES MELLITUS  (HCC): ICD-10-CM

## 2025-03-27 RX ORDER — SEMAGLUTIDE 0.68 MG/ML
INJECTION, SOLUTION SUBCUTANEOUS
COMMUNITY
Start: 2025-03-13

## 2025-03-27 RX ORDER — METOPROLOL SUCCINATE 100 MG/1
100 TABLET, EXTENDED RELEASE ORAL DAILY
COMMUNITY

## 2025-03-27 RX ORDER — TADALAFIL 20 MG/1
20 TABLET ORAL DAILY PRN
Qty: 10 TABLET | Refills: 0 | Status: SHIPPED | OUTPATIENT
Start: 2025-03-27 | End: 2025-04-01 | Stop reason: CLARIF

## 2025-03-29 PROBLEM — Z12.11 SCREEN FOR COLON CANCER: Status: RESOLVED | Noted: 2017-05-04 | Resolved: 2025-03-29

## 2025-04-01 NOTE — PROGRESS NOTES
"Name: Christophe Manrique      : 1955      MRN: [id number]  Encounter Provider: CONNOR Rodriguez  Encounter Date: 3/27/2025   Encounter department: [facility]  :  Assessment & Plan  BPH with urinary obstruction    Patient denies urgency, frequency.  Denies hematuria and dysuria.    Erectile dysfunction associated with type 2 diabetes mellitus  (HCC)  Cialis 20mg  Lab Results   Component Value Date    HGBA1C 7.6 (H) 2025       Orders:    tadalafil (CIALIS) 20 MG tablet; Take 1 tablet (20 mg total) by mouth daily as needed for erectile dysfunction    Prostate cancer screening  PSA 0.97 (3/25/2025)  Orders:    PSA, Total Screen; Future    Hypogonadism in male  Testosterone level 277 (2025)           History of Present Illness   Christophe Manrique is a 70 y.o. male who presents with a history of testosterone replacement therapy followed by his PCP for that seen by me for erectile dysfunction placed on Cialis 20 mg on demand presents for prostate cancer check. He has normal PSA and notes only mild obstructive and irritative voiding symptoms particularly with a full bladder in the morning noting some change in his voiding pattern. The patient does note nocturia once nightly that was not present prior. He denies gross hematuria and dysuria . Denies nocturia.     Had cialis had upset stomach a day afterwards. Then said he had PVCS.       Review of Systems       Objective   /62 (BP Location: Left arm, Patient Position: Sitting, Cuff Size: Standard)   Pulse 71   Ht 5' 11\" (1.803 m)   Wt (!) 145 kg (320 lb)   SpO2 95%   BMI 44.63 kg/m²     Physical Exam      Results   Lab Results   Component Value Date    PSA 0.97 2025    PSA 0.80 2024     Lab Results   Component Value Date    CALCIUM 9.3 2025    K 4.5 2025    CO2 29 2025     2025    BUN 13 2025    CREATININE 0.97 2025     No results found for: \"WBC\", \"HGB\", \"HCT\", " "\"MCV\", \"PLT\"    Office Urine Dip  No results found for this or any previous visit (from the past hour).      "